# Patient Record
Sex: MALE | Race: WHITE | NOT HISPANIC OR LATINO | Employment: FULL TIME | URBAN - METROPOLITAN AREA
[De-identification: names, ages, dates, MRNs, and addresses within clinical notes are randomized per-mention and may not be internally consistent; named-entity substitution may affect disease eponyms.]

---

## 2021-11-08 ENCOUNTER — TRANSCRIBE ORDERS (OUTPATIENT)
Dept: PHYSICAL THERAPY | Facility: CLINIC | Age: 46
End: 2021-11-08

## 2021-11-08 DIAGNOSIS — S83.412A SPRAIN OF MEDIAL COLLATERAL LIGAMENT OF LEFT KNEE, INITIAL ENCOUNTER: Primary | ICD-10-CM

## 2021-11-10 ENCOUNTER — TREATMENT (OUTPATIENT)
Dept: PHYSICAL THERAPY | Facility: CLINIC | Age: 46
End: 2021-11-10

## 2021-11-10 DIAGNOSIS — S89.91XD RIGHT KNEE INJURY, SUBSEQUENT ENCOUNTER: Primary | ICD-10-CM

## 2021-11-10 DIAGNOSIS — S83.411D SPRAIN OF MEDIAL COLLATERAL LIGAMENT OF RIGHT KNEE, SUBSEQUENT ENCOUNTER: ICD-10-CM

## 2021-11-10 DIAGNOSIS — M25.561 RECURRENT PAIN OF RIGHT KNEE: ICD-10-CM

## 2021-11-10 PROCEDURE — 97110 THERAPEUTIC EXERCISES: CPT | Performed by: PHYSICAL THERAPIST

## 2021-11-10 PROCEDURE — 97161 PT EVAL LOW COMPLEX 20 MIN: CPT | Performed by: PHYSICAL THERAPIST

## 2021-11-10 NOTE — PROGRESS NOTES
Physical Therapy Initial Evaluation and Plan of Care    Patient: Amy Ballesteros   : 1975  Diagnosis/ICD-10 Code:  Recurrent pain of right knee [M25.561]  Referring practitioner: KAREN Cash  Date of Initial Visit: 11/10/2021  Today's Date: 11/10/2021  Patient seen for 1 sessions           Subjective Questionnaire: Oxford Knee score  30/48      Subjective Evaluation    History of Present Illness  Mechanism of injury: CHIEF C/O   R meidal knee pain and feels unstable  CURRENT EPISODE   Tripped backward over a pallet last 21. He did go to [facility] the same day of injury - placed on light duty, IBP and neoprene sleeve.  Back to [place] this past Monday with referral to PT.  He is currently on light duty at work.  States the knee pain did improve from onset through Friday.  Not much change since that time.   He does have to go up steps at home ( 5 steps with railing).  He is doing 1 step, 2 feet during ascend and descend.   ONSET   21  LOCATION   R medial and posterior knee  DESCRIPTION:  Posterior knee more of a dull pain and with certain movements there is a sharp stabbing pain at the medial knee  Has been using heat and ice on the knee  1ST AM:   Very stiff  TIME OF DAY:   Increases with usage vs time of day   BEST/DECREASES:  Sit with 90 degree angle  WORSE/INCREAES:  Twist, move side to side.  SLEEP:   normal is a slight sleeper. He is bothered throughout the night due to the pain at approx 45 min intervals.  Movement produces pain and wakes him and he wakes up to change positions.   EX PROGRAM/ACTIVITES   sporadic hiking and work outs.    PAST MEDICAL HISTORY:   (-) for CA, metal implants, DM seizures, pacemaker      Patient Occupation: [employer] -  .   walks a lot during the day.  Pain  Current pain ratin  At best pain ratin  At worst pain ratin    Hand dominance: right             Objective          Tenderness     Right Knee   Tenderness in the MCL (proximal)  and medial joint line.     Active Range of Motion   Left Knee   Flexion: 126 degrees   Extension: 0 degrees     Right Knee   Flexion: 120 degrees   Extensor lag: 10 degrees     Patellar Mobility     Right Knee Patellar tendons within functional limits include the medial, lateral, superior and inferior.     Patellar Static Positioning   Right Knee: medial tilt    Strength/Myotome Testing     Right Knee   Flexion: 4-  Extension: 4-  Quadriceps contraction: good    Additional Strength Details  Mild pain with resisted knee extension    Tests     Additional Tests Details  Pain with valgus stress test and meidal Laury test    Ambulation     Ambulation: Level Surfaces     Additional Level Surfaces Ambulation Details  Independent wo device. Slight reduction of R stance time, early heel off.          Assessment & Plan     Assessment  Impairments: abnormal coordination, abnormal gait, abnormal muscle tone, abnormal or restricted ROM, activity intolerance, impaired balance, impaired physical strength, lacks appropriate home exercise program, pain with function, safety issue and weight-bearing intolerance  Assessment details: Amy Ballesteros is a 46 y.o. male referred to PT for rehab post injury to the R knee sustained he tripped backward over a pallet at work.  Onset date of 11/2/21 Positive tests of MCL and possible medial meniscus injury Patient was seen in the clinic today for the initial evaluation and treatment.  There is noted deficits in ROM, flexibility and strength with subsequent gait and balance impairments.  Skilled physical therapy is indicated in order to achieve the stated goals and attain maximal functional mobility/capacity.  Current Oxford knee score of 30/48.  He is currently on light duty at work.    Eval complexity:  Low       Prognosis: good  Functional Limitations: carrying objects, lifting, walking, uncomfortable because of pain, moving in bed, sitting, standing and stooping  Goals  Plan Goals: STG:  "  2 weeks     1)  Improve knee ROM to WNLs     2)  Patient to complete SLR wo extensor lag     3)  Gait on level surfaces void of deficits     4)  Patient will complete a 4\" step up and down wo greater pain      5)  Pain level in the knee 0-3/10     LTGs   DC     1)   0 pain in the knee     2)  Normal gait on level and unlevel surfaces     3)  SLS balance =/> 30 seconds for reduction of falls and complete muti-level tasks      4)  Ascend/descend steps in normal sequencing      5)  MMT 5/5 LE for completion of lifting, squatting, bending      6)  Normal knee ROM in order to complete dressing, bathing and positional changes     7)  Improve OKI score =/> 9 points for improved functional tasks     8)  Independent in final advance HEP for management.    Plan  Therapy options: will be seen for skilled physical therapy services  Planned modality interventions: cryotherapy, dry needling, electrical stimulation/Russian stimulation, high voltage pulsed current (pain management), TENS, thermotherapy (hydrocollator packs), ultrasound and iontophoresis  Planned therapy interventions: manual therapy, neuromuscular re-education, soft tissue mobilization, strengthening, stretching, therapeutic activities, joint mobilization, home exercise program, gait training, functional ROM exercises, flexibility, balance/weight-bearing training, body mechanics training and transfer training  Frequency: 3x week  Duration in weeks: 13  Treatment plan discussed with: patient  Plan details: Plan is for PT x's 2 weeks and then reassess the need to continue.         Timed:         Manual Therapy:         mins  14754;     Therapeutic Exercise:    18     mins  69525;     Neuromuscular Sanjeev:        mins  52904;    Therapeutic Activity:          mins  94986;     Gait Training:           mins  10621;     Ultrasound:          mins  26054;    Ionto                                   mins   06226  Self Care                       4     mins   22485  T[place]cio " Repos         mins 38251      Un-Timed:  Electrical Stimulation:         mins  30651 ( );  Dry Needling          mins self-pay  Traction          mins 60577  Low Eval     26     Mins  48540  Mod Eval          Mins  46281  High Eval                            Mins  86764  Re-Eval                               mins  14040        Timed Treatment:   22   mins   Total Treatment:     48   mins    PT SIGNATURE: Georgette Pacheco, PT   DATE TREATMENT INITIATED: 11/10/2021    Initial Certification  Certification Period: fron 11/10/21 to  2/8/2022  I certify that the therapy services are furnished while this patient is under my care.  The services outlined above are required by this patient, and will be reviewed every 90 days.     PHYSICIAN: Isreal Benoit PA      DATE:     Please sign and return via fax to [phone].. Thank you, [facility].

## 2021-11-15 ENCOUNTER — TREATMENT (OUTPATIENT)
Dept: PHYSICAL THERAPY | Facility: CLINIC | Age: 46
End: 2021-11-15

## 2021-11-15 DIAGNOSIS — S83.411D SPRAIN OF MEDIAL COLLATERAL LIGAMENT OF RIGHT KNEE, SUBSEQUENT ENCOUNTER: ICD-10-CM

## 2021-11-15 DIAGNOSIS — M25.561 RECURRENT PAIN OF RIGHT KNEE: Primary | ICD-10-CM

## 2021-11-15 DIAGNOSIS — S89.91XD RIGHT KNEE INJURY, SUBSEQUENT ENCOUNTER: ICD-10-CM

## 2021-11-15 PROCEDURE — 97112 NEUROMUSCULAR REEDUCATION: CPT | Performed by: PHYSICAL THERAPIST

## 2021-11-15 PROCEDURE — 97530 THERAPEUTIC ACTIVITIES: CPT | Performed by: PHYSICAL THERAPIST

## 2021-11-15 PROCEDURE — 97110 THERAPEUTIC EXERCISES: CPT | Performed by: PHYSICAL THERAPIST

## 2021-11-15 NOTE — PROGRESS NOTES
"Physical Therapy Daily Progress Note    VISIT#: 2    Subjective   Amy Ballesteros reports the current pain in the R knee is 3/10.   States that he feels he has more movement in the knee than last week.  He also reports that he did change a tire over the weekend      Objective   R knee ROM:   Flex 131AA @ end of stretches,  Ext 0  R SLS wo difficulty  See Exercise, Manual, and Modality Logs for complete treatment.   Ice at end of session.     Patient Education: cont with SLR and stretches at home    Assessment/Plan   Amy was able to tolerate the new ex with noted fatigue vs pain.  Rotational and adduction movements are still painful.      ST weeks     1)  Improve knee ROM to WNLs     2)  Patient to complete SLR wo extensor lag     3)  Gait on level surfaces void of deficits     4)  Patient will complete a 4\" step up and down wo greater pain      5)  Pain level in the knee 0-3/10     LTGs   DC     1)   0 pain in the knee     2)  Normal gait on level and unlevel surfaces     3)  SLS balance =/> 30 seconds for reduction of falls and complete muti-level tasks      4)  Ascend/descend steps in normal sequencing      5)  MMT 5/5 LE for completion of lifting, squatting, bending      6)  Normal knee ROM in order to complete dressing, bathing and positional changes     7)  Improve OKI score =/> 9 points for improved functional tasks     8)  Independent in final advance HEP for management.    Plan:  Cont with progression of closed chain strengthening and mobility.           Timed:         Manual Therapy:         mins  59935;     Therapeutic Exercise:    19     mins  77378;     Neuromuscular Sanjeev:    10    mins  82439;    Therapeutic Activity:     12     mins  33535;     Gait Training:           mins  98692;     Ultrasound:          mins  52440;    Ionto                                   mins   32925  Self Care                            mins   53548  [facility]                   mins  02677    Un-Timed:  Electrical " Stimulation:         mins  69675 ( );  Dry Needling          mins self-pay  Traction          mins 16057  Low Eval          Mins  82787  Mod Eval          Mins  32080  High Eval                            Mins  19943  Re-Eval                               mins  25800    Timed Treatment:   41   mins   Total Treatment:     49   mins    Georgette Pacheco PT    Physical Therapist

## 2021-11-17 ENCOUNTER — TREATMENT (OUTPATIENT)
Dept: PHYSICAL THERAPY | Facility: CLINIC | Age: 46
End: 2021-11-17

## 2021-11-17 DIAGNOSIS — S89.91XD RIGHT KNEE INJURY, SUBSEQUENT ENCOUNTER: ICD-10-CM

## 2021-11-17 DIAGNOSIS — M25.561 RECURRENT PAIN OF RIGHT KNEE: Primary | ICD-10-CM

## 2021-11-17 DIAGNOSIS — S83.411D SPRAIN OF MEDIAL COLLATERAL LIGAMENT OF RIGHT KNEE, SUBSEQUENT ENCOUNTER: ICD-10-CM

## 2021-11-17 PROCEDURE — 97110 THERAPEUTIC EXERCISES: CPT | Performed by: PHYSICAL THERAPIST

## 2021-11-17 PROCEDURE — 97112 NEUROMUSCULAR REEDUCATION: CPT | Performed by: PHYSICAL THERAPIST

## 2021-11-17 PROCEDURE — 97530 THERAPEUTIC ACTIVITIES: CPT | Performed by: PHYSICAL THERAPIST

## 2021-11-17 NOTE — PROGRESS NOTES
"Physical Therapy Daily Progress Note    VISIT#: 3    Subjective   Amy Ballesteros reports he's a little sore today from walking a lot at work to find his phone which he lost. Pt states when he moves it a certain way there's pain or gets uncomfortable at night in bed. Pt states it feels 'loose' like nothing is supporting it when he picks up his leg to walk.     Pain Rating (0-10): 3    Objective     See Exercise, Manual, and Modality Logs for complete treatment.     Patient Education: cues for therex/NMR/theracts    Assessment/Plan Pt tolerated increased resistance with NK iso hold & TKE on CC, increased step height to 6\", and increased resistance on leg press. No significant increase in pain with any activity performed and did not require ice at end of session .      Progress per Plan of Care and Progress strengthening /stabilization /functional activity            Timed:         Manual Therapy:         mins  47131;     Therapeutic Exercise:   8      mins  20960;     Neuromuscular Sanjeev:  8      mins  47288;    Therapeutic Activity:    12      mins  90380;     Gait Training:           mins  08758;     Ultrasound:         mins  18456;    Ionto                                   mins   69175  Self Care                            mins   20323  Canalith Repos                   mins  60282    Un-Timed:  Electrical Stimulation:         mins  38512 ( );  Dry Needling          mins self-pay  Traction          mins 03873  Low Eval          Mins  04074  Mod Eval          Mins  31664  High Eval                           Mins  51059  Re-Eval                               mins  65558    Bike x10' N/C    Timed Treatment:  28    mins   Total Treatment:    41    mins    Ingrid Nicholson, PT  [place] License # [id number]  Physical Therapist  "

## 2021-11-18 ENCOUNTER — TREATMENT (OUTPATIENT)
Dept: PHYSICAL THERAPY | Facility: CLINIC | Age: 46
End: 2021-11-18

## 2021-11-18 DIAGNOSIS — S89.91XD RIGHT KNEE INJURY, SUBSEQUENT ENCOUNTER: ICD-10-CM

## 2021-11-18 DIAGNOSIS — M25.561 RECURRENT PAIN OF RIGHT KNEE: Primary | ICD-10-CM

## 2021-11-18 DIAGNOSIS — S83.411D SPRAIN OF MEDIAL COLLATERAL LIGAMENT OF RIGHT KNEE, SUBSEQUENT ENCOUNTER: ICD-10-CM

## 2021-11-18 PROCEDURE — 97110 THERAPEUTIC EXERCISES: CPT | Performed by: PHYSICAL THERAPIST

## 2021-11-18 PROCEDURE — 97530 THERAPEUTIC ACTIVITIES: CPT | Performed by: PHYSICAL THERAPIST

## 2021-11-18 PROCEDURE — 97112 NEUROMUSCULAR REEDUCATION: CPT | Performed by: PHYSICAL THERAPIST

## 2021-11-18 NOTE — PROGRESS NOTES
"Physical Therapy Daily Progress Note/Progress Note    VISIT#: 4    Subjective   Amy Ballesteros reports he was mildly sore after last session feeling like he had a work out. Pt states it's getting better every day. Pt gets twinges during the day ~3-4 at worst and at night in bed 5 at worst.     Pain Rating (0-10): 0    Objective     Right Knee   Flexion: 134 degrees   Extensor la degrees LAQ/lag 6 degrees supine with heel prop     Strength/Myotome Testing      Right Knee   Flexion: 4+5-  Extension: 5-  Quadriceps contraction: good    See Exercise Logs for complete treatment.     Patient Education: cues for therex    Assessment/Plan Pt is demonstrating improved mobility, strength and function overall. Pt has met 1 goal, partially met 2 goals and is progressing toward 4 goals. Pt has 2 authorized visits left after today's session and returns to [facility] on Monday.     Goals  Plan Goals: ST weeks     1)  Improve knee ROM to WNLs Progressing     2)  Patient to complete SLR wo extensor lag Progressing     3)  Gait on level surfaces void of deficits Partially Met     4)  Patient will complete a 4\" step up and down wo greater pain Met     5)  Pain level in the knee 0-3/10 Progressing    LTGs   DC     1)   0 pain in the knee     2)  Normal gait on level and unlevel surfaces     3)  SLS balance =/> 30 seconds for reduction of falls and complete muti-level tasks      4)  Ascend/descend steps in normal sequencing Partially Met - able to perform on 6\" step today with UE support     5)  MMT 5/5 LE for completion of lifting, squatting, bending Progressing     6)  Normal knee ROM in order to complete dressing, bathing and positional changes     7)  Improve OKI score =/> 9 points for improved functional tasks     8)  Independent in final advance HEP for management.      Progress per Plan of Care and Progress strengthening /stabilization /functional activity            Timed:         Manual Therapy:         mins  63528;   "   Therapeutic Exercise:   20      mins  79659;     Neuromuscular Sanjeev:   10     mins  77889;    Therapeutic Activity:     8     mins  04241;     Gait Training:           mins  97697;     Ultrasound:         mins  48841;    Ionto                                   mins   85972  Self Care                            mins   32685  Canalith Repos                   mins  69039    Un-Timed:  Electrical Stimulation:         mins  02962 ( );  Dry Needling          mins self-pay  Traction          mins 10737  Low Eval          Mins  14944  Mod Eval          Mins  37202  High Eval                           Mins  07112  Re-Eval                               mins  21829    Timed Treatment:   38   mins   Total Treatment:     38   mins    Ingrid Nicholson PT  [place] License # [id number]  Physical Therapist

## 2021-11-22 ENCOUNTER — TREATMENT (OUTPATIENT)
Dept: PHYSICAL THERAPY | Facility: CLINIC | Age: 46
End: 2021-11-22

## 2021-11-22 DIAGNOSIS — M25.561 RECURRENT PAIN OF RIGHT KNEE: ICD-10-CM

## 2021-11-22 DIAGNOSIS — S83.411D SPRAIN OF MEDIAL COLLATERAL LIGAMENT OF RIGHT KNEE, SUBSEQUENT ENCOUNTER: ICD-10-CM

## 2021-11-22 DIAGNOSIS — S89.91XD RIGHT KNEE INJURY, SUBSEQUENT ENCOUNTER: Primary | ICD-10-CM

## 2021-11-22 PROCEDURE — 97112 NEUROMUSCULAR REEDUCATION: CPT | Performed by: PHYSICAL THERAPIST

## 2021-11-22 PROCEDURE — 97530 THERAPEUTIC ACTIVITIES: CPT | Performed by: PHYSICAL THERAPIST

## 2021-11-22 PROCEDURE — 97110 THERAPEUTIC EXERCISES: CPT | Performed by: PHYSICAL THERAPIST

## 2021-11-22 NOTE — PROGRESS NOTES
"Physical Therapy Daily Progress Note    VISIT#: 5    Subjective   Amy Ballesteros reports that he did see the [facility] and is waiting to be scheduled for MRI.  The knee \"shifts\"  Pain level is at times 3-4/10.        Objective   Gait without noted deficits.    See Exercise, Manual, and Modality Logs for complete treatment.     Patient Education:    Assessment/Plan   Amy able to complete the strengthening tasks with mild increased pain during terminal knee extension, whether loaded or stretching.        ST weeks     1)  Improve knee ROM to WNLs Progressing     2)  Patient to complete SLR wo extensor lag Progressing     3)  Gait on level surfaces void of deficits Partially Met     4)  Patient will complete a 4\" step up and down wo greater pain Met     5)  Pain level in the knee 0-3/10 Progressing    LTGs   DC     1)   0 pain in the knee     2)  Normal gait on level and unlevel surfaces     3)  SLS balance =/> 30 seconds for reduction of falls and complete muti-level tasks      4)  Ascend/descend steps in normal sequencing Partially Met - able to perform on 6\" step today with UE support     5)  MMT 5/5 LE for completion of lifting, squatting, bending Progressing     6)  Normal knee ROM in order to complete dressing, bathing and positional changes     7)  Improve OKI score =/> 9 points for improved functional tasks     8)  Independent in final advance HEP for management.    Plan:  Cont as per [facility].  MRI to be scheduled            Timed:         Manual Therapy:         mins  29828;     Therapeutic Exercise:    16     mins  37997;     Neuromuscular Sanjeev:    11    mins  43875;    Therapeutic Activity:     12     mins  01208;     Gait Training:           mins  22319;     Ultrasound:          mins  48694;    Ionto                                   mins   83275  Self Care                            mins   40203  Canalith Repos                   mins  67855    Un-Timed:  Electrical Stimulation:         mins  " 72910 ( );  Dry Needling          mins self-pay  Traction          mins 57601  Low Eval          Mins  18567  Mod Eval          Mins  33764  High Eval                            Mins  58997  Re-Eval                               mins  47726    Timed Treatment:   39   mins   Total Treatment:     58   mins    Georgette Pacheco, PT    Physical Therapist

## 2021-11-23 ENCOUNTER — TRANSCRIBE ORDERS (OUTPATIENT)
Dept: ADMINISTRATIVE | Facility: HOSPITAL | Age: 46
End: 2021-11-23

## 2021-11-23 DIAGNOSIS — S83.281S ACUTE LATERAL MENISCUS TEAR OF RIGHT KNEE, SEQUELA: ICD-10-CM

## 2021-11-23 DIAGNOSIS — S83.91XA SPRAIN OF RIGHT KNEE, INITIAL ENCOUNTER: Primary | ICD-10-CM

## 2021-11-24 ENCOUNTER — TREATMENT (OUTPATIENT)
Dept: PHYSICAL THERAPY | Facility: CLINIC | Age: 46
End: 2021-11-24

## 2021-11-24 DIAGNOSIS — M25.561 RECURRENT PAIN OF RIGHT KNEE: Primary | ICD-10-CM

## 2021-11-24 DIAGNOSIS — S83.411D SPRAIN OF MEDIAL COLLATERAL LIGAMENT OF RIGHT KNEE, SUBSEQUENT ENCOUNTER: ICD-10-CM

## 2021-11-24 DIAGNOSIS — S89.91XD RIGHT KNEE INJURY, SUBSEQUENT ENCOUNTER: ICD-10-CM

## 2021-11-24 PROCEDURE — 97530 THERAPEUTIC ACTIVITIES: CPT | Performed by: PHYSICAL THERAPIST

## 2021-11-24 PROCEDURE — 97112 NEUROMUSCULAR REEDUCATION: CPT | Performed by: PHYSICAL THERAPIST

## 2021-11-24 PROCEDURE — 97110 THERAPEUTIC EXERCISES: CPT | Performed by: PHYSICAL THERAPIST

## 2021-11-24 NOTE — PROGRESS NOTES
"Physical Therapy Daily Progress Note    VISIT#: 6    Subjective   Amy Ballesteros reports that the knee is better than what it was at the beginning.  Has certain points during certain movements that are \"catchy\" and hurt.   Pain levels 2-8/10.   The  Highest points is noted with turning and certain positions.      Objective   R knee ROM:  Flex 124, ext 0  Pain at end range of flexion and greater pain with terminal knee extension.   MMT:  SLr wo lag  Gait:  Slight reduced wt on RLE with mild reduction of terminal knee extension and reduced stance time.   Step up:  6\" ht step.   See Exercise, Manual, and Modality Logs for complete treatment.     Patient Education:    Assessment/Plan   Amy has been to PT x's 6 sessions.  He has demonstrated good progress regarding his motion, strength and gait.  However, he continues to present with signs of probable medial mensicus injury.     ST weeks measured as of 21     1)  Improve knee ROM to WNLs Progressing      2)  Patient to complete SLR wo extensor lag Progressing     3)  Gait on level surfaces void of deficits Partially Met     4)  Patient will complete a 4\" step up and down wo greater pain Met     5)  Pain level in the knee 0-3/10 Progressing    LTGs   DC     1)   0 pain in the knee     2)  Normal gait on level and unlevel surfaces     3)  SLS balance =/> 30 seconds for reduction of falls and complete muti-level tasks      4)  Ascend/descend steps in normal sequencing Partially Met - able to perform on 6\" step today with UE support     5)  MMT 5/5 LE for completion of lifting, squatting, bending Progressing     6)  Normal knee ROM in order to complete dressing, bathing and positional changes     7)  Improve OKI score =/> 9 points for improved functional tasks     8)  Independent in final advance HEP for management    Plan:  Ins auth required.             Timed:         Manual Therapy:         mins  36761;     Therapeutic Exercise:    16     mins  16072;   "   Neuromuscular Sanjeev:    10    mins  91376;    Therapeutic Activity:     13     mins  68778;     Gait Training:           mins  86284;     Ultrasound:          mins  11732;    Ionto                                   mins   92142  Self Care                            mins   06229  Canalith Repos                   mins  23080    Un-Timed:  Electrical Stimulation:         mins  02434 ( );  Dry Needling          mins self-pay  Traction          mins 99673  Low Eval          Mins  01263  Mod Eval          Mins  46726  High Eval                            Mins  57487  Re-Eval                               mins  38611    Timed Treatment:   39   mins   Total Treatment:     50   mins    Georgette Pacheco PT    Physical Therapist

## 2021-11-29 ENCOUNTER — TREATMENT (OUTPATIENT)
Dept: PHYSICAL THERAPY | Facility: CLINIC | Age: 46
End: 2021-11-29

## 2021-11-29 DIAGNOSIS — S83.411D SPRAIN OF MEDIAL COLLATERAL LIGAMENT OF RIGHT KNEE, SUBSEQUENT ENCOUNTER: ICD-10-CM

## 2021-11-29 DIAGNOSIS — M25.561 RECURRENT PAIN OF RIGHT KNEE: Primary | ICD-10-CM

## 2021-11-29 DIAGNOSIS — S89.91XD RIGHT KNEE INJURY, SUBSEQUENT ENCOUNTER: ICD-10-CM

## 2021-11-29 PROCEDURE — 97110 THERAPEUTIC EXERCISES: CPT | Performed by: PHYSICAL THERAPIST

## 2021-11-29 PROCEDURE — 97530 THERAPEUTIC ACTIVITIES: CPT | Performed by: PHYSICAL THERAPIST

## 2021-11-29 NOTE — PROGRESS NOTES
"Physical Therapy Daily Progress Note    VISIT#: 7    Subjective   Amy Ballesteros reports that his knee has \"not been happy\" since the standing quad stretch.       Objective   MRI is scheduled for  12/17th.    Gait slightly antalgic.  With noted pain during   R knee motion:  Flex 120A/ 123AA;   Ext (-) 3  See Exercise, Manual, and Modality Logs for complete treatment.     Patient Education:    Assessment/Plan  Amy reports more pain in knee since last week.  States the knee pain occurs with turning, bending and straightening.  His knee motion was only slightly off as compared to the last appt.  He was able to complete the ex with slightly less resistance in some of the positions.      Plan:  Cont as tolerated.             Timed:         Manual Therapy:         mins  36166;     Therapeutic Exercise:    29     mins  20327;     Neuromuscular Sanjeev:        mins  27917;    Therapeutic Activity:     15     mins  17879;     Gait Training:           mins  12322;     Ultrasound:          mins  32039;    Ionto                                   mins   71693  Self Care                            mins   79145  Canalith Repos                   mins  43124    Un-Timed:  Electrical Stimulation:         mins  72378 ( );  Dry Needling          mins self-pay  Traction          mins 86714  Low Eval          Mins  55374  Mod Eval          Mins  62168  High Eval                            Mins  83412  Re-Eval                               mins  52416    Timed Treatment:   44   mins   Total Treatment:     65   mins    Georgette Pacheco PT    Physical Therapist  "

## 2021-12-01 ENCOUNTER — TREATMENT (OUTPATIENT)
Dept: PHYSICAL THERAPY | Facility: CLINIC | Age: 46
End: 2021-12-01

## 2021-12-01 DIAGNOSIS — S89.91XD RIGHT KNEE INJURY, SUBSEQUENT ENCOUNTER: ICD-10-CM

## 2021-12-01 DIAGNOSIS — S83.411D SPRAIN OF MEDIAL COLLATERAL LIGAMENT OF RIGHT KNEE, SUBSEQUENT ENCOUNTER: ICD-10-CM

## 2021-12-01 DIAGNOSIS — M25.561 RECURRENT PAIN OF RIGHT KNEE: Primary | ICD-10-CM

## 2021-12-01 PROCEDURE — 97110 THERAPEUTIC EXERCISES: CPT | Performed by: PHYSICAL THERAPIST

## 2021-12-01 PROCEDURE — 97530 THERAPEUTIC ACTIVITIES: CPT | Performed by: PHYSICAL THERAPIST

## 2021-12-01 NOTE — PROGRESS NOTES
Physical Therapy Daily Progress Note    VISIT#: 8    Subjective   Amy Ballesteros reports pain is 4/10 today. Pt states it's been worse since he did the standing quad stretch. Pt states it feels more 'loose' in the joint and catches. Pt notes it's ok with straight line activities like the bike or the leg press. Pt has MRI on 12/13 and MD appt 12/14.     Objective     See Exercise Logs for complete treatment.     Patient Education: cues for therex    Assessment/Plan  Deferred prone knee ext stretch as pt notes that aggravates the knee right now. Pt declined modalities at end of session.     Progress per Plan of Care and Progress strengthening /stabilization /functional activity            Timed:         Manual Therapy:         mins  04165;     Therapeutic Exercise:   10      mins  54266;     Neuromuscular Sanjeev:        mins  37501;    Therapeutic Activity:     23     mins  51799;     Gait Training:           mins  54724;     Ultrasound:         mins  00748;    Ionto                                   mins   16186  Self Care                            mins   87781  Canalith Repos                   mins  51969    Un-Timed:  Electrical Stimulation:         mins  06545 ( );  Dry Needling          mins self-pay  Traction          mins 11415  Low Eval          Mins  20761  Mod Eval          Mins  33876  High Eval                           Mins  25770  Re-Eval                               mins  34721    Bike x10' N/C    Timed Treatment:   33    mins   Total Treatment:     43   mins    Ingrid Nicholson PT  [place] License # [id number]  Physical Therapist

## 2021-12-13 ENCOUNTER — HOSPITAL ENCOUNTER (OUTPATIENT)
Dept: MRI IMAGING | Facility: HOSPITAL | Age: 46
Discharge: HOME | End: 2021-12-13
Admitting: PHYSICIAN ASSISTANT

## 2021-12-13 DIAGNOSIS — S83.281S ACUTE LATERAL MENISCUS TEAR OF RIGHT KNEE, SEQUELA: ICD-10-CM

## 2021-12-13 DIAGNOSIS — S83.91XA SPRAIN OF RIGHT KNEE, INITIAL ENCOUNTER: ICD-10-CM

## 2021-12-13 PROCEDURE — 73721 MRI JNT OF LWR EXTRE W/O DYE: CPT

## 2021-12-16 ENCOUNTER — OFFICE VISIT (OUTPATIENT)
Dept: ORTHOPEDIC SURGERY | Facility: CLINIC | Age: 46
End: 2021-12-16

## 2021-12-16 VITALS
WEIGHT: 252 LBS | DIASTOLIC BLOOD PRESSURE: 88 MMHG | BODY MASS INDEX: 36.08 KG/M2 | SYSTOLIC BLOOD PRESSURE: 142 MMHG | HEIGHT: 70 IN | HEART RATE: 74 BPM

## 2021-12-16 DIAGNOSIS — M25.561 ACUTE PAIN OF RIGHT KNEE: Primary | ICD-10-CM

## 2021-12-16 PROCEDURE — 97760 ORTHOTIC MGMT&TRAING 1ST ENC: CPT | Performed by: ORTHOPAEDIC SURGERY

## 2021-12-16 PROCEDURE — 99203 OFFICE O/P NEW LOW 30 MIN: CPT | Performed by: ORTHOPAEDIC SURGERY

## 2021-12-16 RX ORDER — FEXOFENADINE HYDROCHLORIDE 60 MG/1
TABLET, FILM COATED ORAL
COMMUNITY

## 2021-12-16 RX ORDER — FLUTICASONE PROPIONATE 50 MCG
2 SPRAY, SUSPENSION (ML) NASAL DAILY
COMMUNITY
Start: 2021-09-23 | End: 2022-09-23

## 2021-12-16 NOTE — PROGRESS NOTES
"     Patient ID: Amy Ballesteros is a 46 y.o. male.    Chief Complaint:    Chief Complaint   Patient presents with   • Right Knee - Initial Evaluation       HPI:  Amy is a 46-year-old gentleman here with a recent knee injury at work when he fell.  He felt a pop and since then has had medial joint line pain with a feeling of instability.  He has been on light duty and wear a knee sleeve and did some therapy with mild improvement  History reviewed. No pertinent past medical history.    History reviewed. No pertinent surgical history.    Family History   Problem Relation Age of Onset   • Cancer Mother    • Cancer Father           Social History     Occupational History   • Not on file   Tobacco Use   • Smoking status: Former Smoker   • Smokeless tobacco: Never Used   Vaping Use   • Vaping Use: Never used   Substance and Sexual Activity   • Alcohol use: Yes   • Drug use: Never   • Sexual activity: Not on file      Review of Systems   Cardiovascular: Negative for chest pain.   Musculoskeletal: Positive for arthralgias.       Objective:    /88   Pulse 74   Ht 177.8 cm (70\")   Wt 114 kg (252 lb)   BMI 36.16 kg/m²     Physical Examination:  Right knee demonstrates intact skin with a mild effusion.  He has mild medial joint line tenderness.  Knee range of motion is 0 to 125 degrees with 1+ valgus instability at 30 degrees of flexion no varus instability Lachman demonstrates slight anterior glide with a firm endpoint but is symmetric.  Posterior drawer negative.  Anterior drawer again has slight anterior glide with symmetric findings.  Rohnda negative.  Sensory and motor exam are intact in all distributions. Dorsalis pedis and posterior tibialis pulses are palpable and capillary refill is less than two seconds to all digits.    Imaging:  X-ray and MRI from December 2021 demonstrate well-maintained joint spaces with grade 2 tear of the medial collateral ligament    Assessment:  Right knee MCL sprain    Plan:  I " recommend an MCL brace.  He declined physical therapy.  Continue light duty see me in a month  MCL brace was fitted today  Greater than 15 minutes was spent demonstrating proper fit and use of the device and signs to monitor for complications      Procedures         Disclaimer: Part of this note may be an electronic transcription/translation of spoken language to printed text using the Dragon Dictation System

## 2022-01-13 ENCOUNTER — OFFICE VISIT (OUTPATIENT)
Dept: ORTHOPEDIC SURGERY | Facility: CLINIC | Age: 47
End: 2022-01-13

## 2022-01-13 VITALS
SYSTOLIC BLOOD PRESSURE: 131 MMHG | WEIGHT: 254 LBS | HEART RATE: 67 BPM | HEIGHT: 70 IN | DIASTOLIC BLOOD PRESSURE: 89 MMHG | BODY MASS INDEX: 36.36 KG/M2

## 2022-01-13 DIAGNOSIS — S83.411A GRADE 2 SPRAIN OF MEDIAL COLLATERAL LIGAMENT OF RIGHT KNEE: Primary | ICD-10-CM

## 2022-01-13 PROCEDURE — 99213 OFFICE O/P EST LOW 20 MIN: CPT | Performed by: ORTHOPAEDIC SURGERY

## 2022-01-13 NOTE — PROGRESS NOTES
"     Patient ID: Amy Ballesteros is a 46 y.o. male.  Right knee pain follows up for MCL sprain suffered about 8 weeks ago treated conservatively with a brace  And home exercise program    Review of Systems:  Right knee pain      Objective:    /89   Pulse 67   Ht 177.8 cm (70\")   Wt 115 kg (254 lb)   BMI 36.45 kg/m²     Physical Examination:   Right knee demonstrates intact skin with a mild effusion.  He has mild medial joint line tenderness.  Knee range of motion is 0 to 125 degrees with no varus valgus laxity. Lachman demonstrates slight anterior glide with a firm endpoint but is symmetric.  Posterior drawer negative.  Anterior drawer again has slight anterior glide with symmetric findings.  Rhonda negative.  Sensory and motor exam are intact in all distributions. Dorsalis pedis and posterior tibialis pulses are palpable and capillary refill is less than two seconds to all digits       Imaging:       Assessment:    Healing MCL sprain    Plan:   Continue home exercise rehab program.  Continue brace as needed see me back in a month continue light duty until that time      Procedures          Disclaimer: Part of this note may be an electronic transcription/translation of spoken language to printed text using the Dragon Dictation System  "

## 2022-02-10 ENCOUNTER — OFFICE VISIT (OUTPATIENT)
Dept: ORTHOPEDIC SURGERY | Facility: CLINIC | Age: 47
End: 2022-02-10

## 2022-02-10 VITALS
HEIGHT: 70 IN | SYSTOLIC BLOOD PRESSURE: 135 MMHG | HEART RATE: 69 BPM | WEIGHT: 254 LBS | DIASTOLIC BLOOD PRESSURE: 91 MMHG | BODY MASS INDEX: 36.36 KG/M2

## 2022-02-10 DIAGNOSIS — S83.411A GRADE 2 SPRAIN OF MEDIAL COLLATERAL LIGAMENT OF RIGHT KNEE: Primary | ICD-10-CM

## 2022-02-10 PROCEDURE — 99213 OFFICE O/P EST LOW 20 MIN: CPT | Performed by: ORTHOPAEDIC SURGERY

## 2022-02-10 NOTE — PROGRESS NOTES
"     Patient ID: Amy Ballesteros is a 46 y.o. male.  Right knee pain follows up for MCL sprain suffered about 12 weeks ago treated conservatively with a brace  Continues on light duty, pain only with twisting or heavy squatting  Review of Systems:    Right knee pain improving    Objective:    /91   Pulse 69   Ht 177.8 cm (70\")   Wt 115 kg (254 lb)   BMI 36.45 kg/m²     Physical Examination:  Right knee demonstrates intact skin no effusion no medial joint line tenderness neutral motion 0-1 25 no laxity.  Lachman anterior drawer continues to be symmetric to the other knee.       Imaging:       Assessment:    Healed MCL sprain    Plan:   Finish out light duty for the next couple weeks return to full duty on February 28 MMI at that point see me as needed      Procedures          Disclaimer: Part of this note may be an electronic transcription/translation of spoken language to printed text using the Dragon Dictation System  "

## 2022-02-15 ENCOUNTER — TELEPHONE (OUTPATIENT)
Dept: ORTHOPEDIC SURGERY | Facility: CLINIC | Age: 47
End: 2022-02-15

## 2022-02-15 NOTE — TELEPHONE ENCOUNTER
Caller: JOEY    Relationship: W/C      Best call back number: [phone]    What form or medical record are you requesting: OFFICE NOTES AND WORK STATUS FROM APPT  ON 2/10/22    Who is requesting this form or medical record from you: W/C    How would you like to receive the form or medical records (pick-up, mail, fax):   If fax, what is the fax number: [phone]

## 2022-11-29 ENCOUNTER — DOCUMENTATION (OUTPATIENT)
Dept: PHYSICAL THERAPY | Facility: CLINIC | Age: 47
End: 2022-11-29

## 2022-11-29 NOTE — PROGRESS NOTES
Discharge Summary  Discharge Summary from Physical Therapy Report       Dates  PT visit: 11/10/21-21  Number of Visits: 8    Discharge Status of Patient: pt was reporting increased pain and 'loose' in the knee jt when he arrived to his 21 session. Pt never returned to PT and is therefore discharged as the POC is . Pt was to have MRI on 21.     Goals: Goal status is undetermined as pt never returned to PT after the 8th visit.     Discharge Plan: No specific D/C instructions were given as pt never returned to PT after the 8th visit.     Comments: Pt was given HEP during sessions.     Date of Discharge: 22        Ingrid Nicholson PT  Physical Therapist